# Patient Record
Sex: FEMALE | Race: BLACK OR AFRICAN AMERICAN | NOT HISPANIC OR LATINO
[De-identification: names, ages, dates, MRNs, and addresses within clinical notes are randomized per-mention and may not be internally consistent; named-entity substitution may affect disease eponyms.]

---

## 2022-12-08 ENCOUNTER — APPOINTMENT (OUTPATIENT)
Dept: ORTHOPEDIC SURGERY | Facility: CLINIC | Age: 62
End: 2022-12-08

## 2022-12-08 VITALS — HEIGHT: 67 IN | WEIGHT: 214 LBS | BODY MASS INDEX: 33.59 KG/M2

## 2022-12-08 PROCEDURE — J3490M: CUSTOM

## 2022-12-08 PROCEDURE — 20610 DRAIN/INJ JOINT/BURSA W/O US: CPT | Mod: LT

## 2022-12-08 PROCEDURE — 73030 X-RAY EXAM OF SHOULDER: CPT | Mod: LT

## 2022-12-08 PROCEDURE — 99203 OFFICE O/P NEW LOW 30 MIN: CPT | Mod: 25

## 2022-12-08 NOTE — HISTORY OF PRESENT ILLNESS
[7] : 7 [4] : 4 [Dull/Aching] : dull/aching [Intermittent] : intermittent [de-identified] : 12/8/2022: RHD 60 yo female here with appx 3 wks of left shoulder pain. There is no hx of trauma.\par Pt denies left upper extremity numbness/tingling.\par Pain is worse with arm elevation.\par \par PMH: denied\par Allergies: NKDA [FreeTextEntry1] : left shoulder [FreeTextEntry6] : stiffness [de-identified] : motion

## 2022-12-08 NOTE — IMAGING
[Left] : left shoulder [de-identified] : left shoulder with mild swelling\par ROM is limited in all planes actively.\par Passive ROM- Flexion to 120 deg / abduction to 110 deg\par Mild crepitus is noted. \par There is mild TTP over the bicpital region\par Hawkin's Neymar Impingement Sign is equivocal\par Neer Impingement Sign is mildly positive\par Posterior Lift Off Test cannot be performed\par Speed Sign is +\par Yergason sign cannot be performed. \par [place] Test is not performed due to pain\par Sulcus Sign is negative.\par Strength Testing is limited in all planes due to discomfort.\par Spurling Signs are negative\par \par  [FreeTextEntry1] : Significant left shoulder rotator cuff arthropathy

## 2022-12-08 NOTE — ASSESSMENT
[FreeTextEntry1] : Provided left shoulder intra-articular cortisone injection provided.\par Pt referred to Dr. Cerda for consideration of Orthovisc injections / surgical intervention.\par

## 2023-01-06 ENCOUNTER — APPOINTMENT (OUTPATIENT)
Dept: ORTHOPEDIC SURGERY | Facility: CLINIC | Age: 63
End: 2023-01-06
Payer: COMMERCIAL

## 2023-01-06 VITALS — HEIGHT: 67 IN | BODY MASS INDEX: 33.59 KG/M2 | WEIGHT: 214 LBS

## 2023-01-06 PROCEDURE — 99215 OFFICE O/P EST HI 40 MIN: CPT

## 2023-01-06 NOTE — ASSESSMENT
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 \par Discussed trial of visco supplementation.\par OTC NSAIDS.\par Last CSI on 12/8/22.\par RTO prn.

## 2023-01-06 NOTE — HISTORY OF PRESENT ILLNESS
[5] : 5 [0] : 0 [Dull/Aching] : dull/aching [Retired] : Work status: retired [de-identified] : 61 y/o RHD female c/o left shoulder for a little over a month. No specific injury. Describes sharp pain in the shoulder with limited ROM. She saw Dr. Lockhart, who took x-rays and treated with CSI (significant relief). Still notes discomfort with sharp movements and limited ROM. Denies radiating pain. No history of prior injury. [] : Post Surgical Visit: no [FreeTextEntry1] : left shoulder [FreeTextEntry5] : Patient is a 62 year yo female here with right shoulder pain that started in December. Last seen on 12/8/22 by Dr. Lockhart. Recieved xray/cortisone inj which helped with pain. [FreeTextEntry6] : stiffness [de-identified] : cortisone

## 2023-01-06 NOTE — PHYSICAL EXAM
[Left] : left shoulder [3 ___] : forward flexion 3[unfilled]/5 [4___] : external rotation 4[unfilled]/5 [] : no sensory deficits [FreeTextEntry9] :   ER 30

## 2023-03-10 ENCOUNTER — APPOINTMENT (OUTPATIENT)
Dept: ORTHOPEDIC SURGERY | Facility: CLINIC | Age: 63
End: 2023-03-10
Payer: COMMERCIAL

## 2023-03-10 VITALS — BODY MASS INDEX: 33.59 KG/M2 | HEIGHT: 67 IN | WEIGHT: 214 LBS

## 2023-03-10 DIAGNOSIS — M19.012 PRIMARY OSTEOARTHRITIS, LEFT SHOULDER: ICD-10-CM

## 2023-03-10 PROCEDURE — 20611 DRAIN/INJ JOINT/BURSA W/US: CPT | Mod: LT

## 2023-03-10 PROCEDURE — 99214 OFFICE O/P EST MOD 30 MIN: CPT | Mod: 25

## 2023-03-10 PROCEDURE — J3490M: CUSTOM

## 2023-03-10 NOTE — ASSESSMENT
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 \par Discussed trial of visco supplementation.\par OTC NSAIDS.\par Last CSI on 12/8/22.  Repeat SA injection today.\par RTO prn.\par \par Procedure Note:\par Large Joint Injection was performed because of pain and inflammation, failure of conservative treatment.  \par Medications:\par Depo-Medrol: 1 cc, 80 mg.\par Lidocaine: 2 cc, 1%. \par Marcaine: 2 cc, .25%. \par \par Medication was injected in the left subacromial space. Patient has tried OTC's including aspirin, Ibuprofen, Aleve etc or prescription NSAIDs, and/or exercises at home and/ or physical therapy without satisfactory response. The risks, benefits, and alternatives to cortisone injection were explained in full to the patient. Risks outlined include but are not limited to infection, sepsis, bleeding, scarring, skin discoloration, temporary increase in pain, syncopal episode, failure to resolve symptoms, allergic reaction, symptom recurrence, and elevation of blood sugar in diabetics. Patient understood the risks. All questions were answered. After discussion of options, patient requested an injection. Oral informed consent was obtained and sterile prep of the injection site was performed using alcohol. Sterile technique was utilized for the procedure including the preparation of the solutions used for the injection. Ethyl chloride spray was used topically.  Sterile technique used. Patient tolerated procedure well. Post Procedure Instructions: Patient was advised to call if redness, pain, or fever occur and apply ice for 15 min. out of every hour for the next 12-24 hours as tolerated. patient was advised to rest the joint(s) for 2 days.\par \par Ultrasound Guidance was used for the following reasons: for prior failure or difficult injection and to visualize tearing and inflammation.\par Ultrasound guided injection was performed of the shoulder, visualization of the needle and placement of injection was performed without complication.\par

## 2023-03-10 NOTE — HISTORY OF PRESENT ILLNESS
[5] : 5 [0] : 0 [Dull/Aching] : dull/aching [Retired] : Work status: retired [de-identified] : 3/10/23 : Here for follow up. Pain has improved somewhat. Does heat/ ice as needed. Interested in trying another csi.\par \par 1/06/23: 63 y/o RHD female c/o left shoulder for a little over a month. No specific injury. Describes sharp pain in the shoulder with limited ROM. She saw Dr. Lockhart, who took x-rays and treated with CSI (significant relief). Still notes discomfort with sharp movements and limited ROM. Denies radiating pain. No history of prior injury. [] : Post Surgical Visit: no [FreeTextEntry1] : left shoulder [FreeTextEntry5] : Patient is a 62 year yo female here with right shoulder pain that started in December. Last seen on 12/8/22 by Dr. Lockhart. Recieved xray/cortisone inj which helped with pain. [FreeTextEntry6] : stiffness [de-identified] : cortisone

## 2023-04-05 ENCOUNTER — APPOINTMENT (OUTPATIENT)
Dept: ORTHOPEDIC SURGERY | Facility: CLINIC | Age: 63
End: 2023-04-05
Payer: COMMERCIAL

## 2023-04-05 VITALS — WEIGHT: 210 LBS | HEIGHT: 67 IN | BODY MASS INDEX: 32.96 KG/M2

## 2023-04-05 DIAGNOSIS — M17.11 UNILATERAL PRIMARY OSTEOARTHRITIS, RIGHT KNEE: ICD-10-CM

## 2023-04-05 PROCEDURE — 99214 OFFICE O/P EST MOD 30 MIN: CPT

## 2023-04-05 NOTE — PHYSICAL EXAM
[5___] : hamstring 5[unfilled]/5 [] : mildly antalgic [Left] : left knee [AP] : anteroposterior [Lateral] : lateral [advanced tricompartmental OA with lateral compartment narrowing and valgus alignment] : advanced tricompartmental OA with lateral compartment narrowing and valgus alignment [TWNoteComboBox7] : flexion 125 degrees

## 2023-04-05 NOTE — DISCUSSION/SUMMARY
[de-identified] : Patient allowed to gently start resuming activities.\par Discussed change to medication prescription and usage. \par Offered cortisone steroid injection. \par Bracing options discussed with patient. \par Hyaluronic Acid inj pamphlet given to pt.\par

## 2023-04-05 NOTE — HISTORY OF PRESENT ILLNESS
[Rest] : rest [Ice] : ice [de-identified] : started last month, worse in pm, went to PMD, had xrays taken and prednisone dose pack and still bothering her, certain days worse than others and used a cane a nd a brace for a while [] : no [FreeTextEntry1] : right knee  [FreeTextEntry5] : pt has been having right knee pain since March, she woke up in the night to grab something and could not put weight on her knee [FreeTextEntry9] : knee brace or cane [de-identified] : movement  [de-identified] : last week  [de-identified] : her PCP  [de-identified] : XR

## 2023-07-07 ENCOUNTER — APPOINTMENT (OUTPATIENT)
Dept: ORTHOPEDIC SURGERY | Facility: CLINIC | Age: 63
End: 2023-07-07
Payer: COMMERCIAL

## 2023-07-07 VITALS — WEIGHT: 210 LBS | HEIGHT: 67 IN | BODY MASS INDEX: 32.96 KG/M2

## 2023-07-07 DIAGNOSIS — M12.812 OTHER SPECIFIC ARTHROPATHIES, NOT ELSEWHERE CLASSIFIED, LEFT SHOULDER: ICD-10-CM

## 2023-07-07 PROCEDURE — 99213 OFFICE O/P EST LOW 20 MIN: CPT | Mod: 25

## 2023-07-07 PROCEDURE — J3490M: CUSTOM

## 2023-07-07 PROCEDURE — 20611 DRAIN/INJ JOINT/BURSA W/US: CPT | Mod: LT

## 2023-07-07 NOTE — ASSESSMENT
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 \par Discussed trial of visco supplementation.\par OTC NSAIDS.\par Repeat SA injection today.\par RTO prn.\par \par Procedure Note:\par Large Joint Injection was performed because of pain and inflammation, failure of conservative treatment.  \par Medications:\par Depo-Medrol: 1 cc, 80 mg.\par Lidocaine: 2 cc, 1%. \par Marcaine: 2 cc, .25%. \par \par Medication was injected in the left subacromial space. Patient has tried OTC's including aspirin, Ibuprofen, Aleve etc or prescription NSAIDs, and/or exercises at home and/ or physical therapy without satisfactory response. The risks, benefits, and alternatives to cortisone injection were explained in full to the patient. Risks outlined include but are not limited to infection, sepsis, bleeding, scarring, skin discoloration, temporary increase in pain, syncopal episode, failure to resolve symptoms, allergic reaction, symptom recurrence, and elevation of blood sugar in diabetics. Patient understood the risks. All questions were answered. After discussion of options, patient requested an injection. Oral informed consent was obtained and sterile prep of the injection site was performed using alcohol. Sterile technique was utilized for the procedure including the preparation of the solutions used for the injection. Ethyl chloride spray was used topically.  Sterile technique used. Patient tolerated procedure well. Post Procedure Instructions: Patient was advised to call if redness, pain, or fever occur and apply ice for 15 min. out of every hour for the next 12-24 hours as tolerated. patient was advised to rest the joint(s) for 2 days.\par \par Ultrasound Guidance was used for the following reasons: for prior failure or difficult injection and to visualize tearing and inflammation.\par Ultrasound guided injection was performed of the shoulder, visualization of the needle and placement of injection was performed without complication.\par

## 2023-07-07 NOTE — HISTORY OF PRESENT ILLNESS
[5] : 5 [0] : 0 [Dull/Aching] : dull/aching [Retired] : Work status: retired [de-identified] : 7/7/23: Here for follow up.  She got great relief from the last injeciton.  She would like a repeat as she is traveling. \par \par 3/10/23 : Here for follow up. Pain has improved somewhat. Does heat/ ice as needed. Interested in trying another csi.\par \par 1/06/23: 61 y/o RHD female c/o left shoulder for a little over a month. No specific injury. Describes sharp pain in the shoulder with limited ROM. She saw Dr. Lockhart, who took x-rays and treated with CSI (significant relief). Still notes discomfort with sharp movements and limited ROM. Denies radiating pain. No history of prior injury. [] : Post Surgical Visit: no [FreeTextEntry1] : left shoulder [FreeTextEntry5] : Patient is a 62 year yo female here with right shoulder pain that started in December. Last seen on 12/8/22 by Dr. Lockhart. Recieved xray/cortisone inj which helped with pain. [FreeTextEntry6] : stiffness [de-identified] : cortisone